# Patient Record
Sex: FEMALE | Race: OTHER | NOT HISPANIC OR LATINO
[De-identification: names, ages, dates, MRNs, and addresses within clinical notes are randomized per-mention and may not be internally consistent; named-entity substitution may affect disease eponyms.]

---

## 2018-07-24 PROBLEM — Z00.00 ENCOUNTER FOR PREVENTIVE HEALTH EXAMINATION: Status: ACTIVE | Noted: 2018-07-24

## 2018-09-10 ENCOUNTER — APPOINTMENT (OUTPATIENT)
Dept: OBGYN | Facility: CLINIC | Age: 23
End: 2018-09-10
Payer: COMMERCIAL

## 2018-09-10 VITALS
WEIGHT: 154 LBS | DIASTOLIC BLOOD PRESSURE: 62 MMHG | BODY MASS INDEX: 23.34 KG/M2 | SYSTOLIC BLOOD PRESSURE: 106 MMHG | HEIGHT: 68 IN

## 2018-09-10 DIAGNOSIS — Z01.411 ENCOUNTER FOR GYNECOLOGICAL EXAMINATION (GENERAL) (ROUTINE) WITH ABNORMAL FINDINGS: ICD-10-CM

## 2018-09-10 DIAGNOSIS — Z78.9 OTHER SPECIFIED HEALTH STATUS: ICD-10-CM

## 2018-09-10 DIAGNOSIS — N94.6 DYSMENORRHEA, UNSPECIFIED: ICD-10-CM

## 2018-09-10 DIAGNOSIS — Z11.3 ENCOUNTER FOR SCREENING FOR INFECTIONS WITH A PREDOMINANTLY SEXUAL MODE OF TRANSMISSION: ICD-10-CM

## 2018-09-10 DIAGNOSIS — Z82.49 FAMILY HISTORY OF ISCHEMIC HEART DISEASE AND OTHER DISEASES OF THE CIRCULATORY SYSTEM: ICD-10-CM

## 2018-09-10 DIAGNOSIS — Z80.3 FAMILY HISTORY OF MALIGNANT NEOPLASM OF BREAST: ICD-10-CM

## 2018-09-10 PROCEDURE — 36415 COLL VENOUS BLD VENIPUNCTURE: CPT

## 2018-09-10 PROCEDURE — 99385 PREV VISIT NEW AGE 18-39: CPT

## 2018-09-11 LAB
HBV SURFACE AG SER QL: NONREACTIVE
HCV AB SER QL: NONREACTIVE
HCV S/CO RATIO: 0.21 S/CO
HIV1+2 AB SPEC QL IA.RAPID: NONREACTIVE
T PALLIDUM AB SER QL IA: NEGATIVE

## 2018-09-13 LAB
C TRACH RRNA SPEC QL NAA+PROBE: NOT DETECTED
N GONORRHOEA RRNA SPEC QL NAA+PROBE: NOT DETECTED
SOURCE TP AMPLIFICATION: NORMAL

## 2018-09-14 LAB — CYTOLOGY CVX/VAG DOC THIN PREP: NORMAL

## 2018-11-13 ENCOUNTER — APPOINTMENT (OUTPATIENT)
Dept: OBGYN | Facility: CLINIC | Age: 23
End: 2018-11-13
Payer: COMMERCIAL

## 2018-11-13 ENCOUNTER — LABORATORY RESULT (OUTPATIENT)
Age: 23
End: 2018-11-13

## 2018-11-13 PROCEDURE — 57454 BX/CURETT OF CERVIX W/SCOPE: CPT

## 2018-12-08 ENCOUNTER — EMERGENCY (EMERGENCY)
Facility: HOSPITAL | Age: 23
LOS: 0 days | Discharge: HOME | End: 2018-12-08
Attending: EMERGENCY MEDICINE
Payer: SELF-PAY

## 2018-12-08 VITALS
SYSTOLIC BLOOD PRESSURE: 104 MMHG | DIASTOLIC BLOOD PRESSURE: 61 MMHG | OXYGEN SATURATION: 100 % | RESPIRATION RATE: 17 BRPM | WEIGHT: 119.93 LBS | HEART RATE: 100 BPM | HEIGHT: 66 IN | TEMPERATURE: 97 F

## 2018-12-08 VITALS
SYSTOLIC BLOOD PRESSURE: 116 MMHG | OXYGEN SATURATION: 98 % | HEART RATE: 104 BPM | RESPIRATION RATE: 16 BRPM | DIASTOLIC BLOOD PRESSURE: 64 MMHG | TEMPERATURE: 98 F

## 2018-12-08 DIAGNOSIS — F10.129 ALCOHOL ABUSE WITH INTOXICATION, UNSPECIFIED: ICD-10-CM

## 2018-12-08 LAB
ALBUMIN SERPL ELPH-MCNC: 3.7 G/DL — SIGNIFICANT CHANGE UP (ref 3.3–5)
ALP SERPL-CCNC: 54 U/L — SIGNIFICANT CHANGE UP (ref 40–120)
ALT FLD-CCNC: 17 U/L — SIGNIFICANT CHANGE UP (ref 12–78)
ANION GAP SERPL CALC-SCNC: 10 MMOL/L — SIGNIFICANT CHANGE UP (ref 5–17)
AST SERPL-CCNC: 15 U/L — SIGNIFICANT CHANGE UP (ref 15–37)
BASOPHILS # BLD AUTO: 0.06 K/UL — SIGNIFICANT CHANGE UP (ref 0–0.2)
BASOPHILS NFR BLD AUTO: 0.5 % — SIGNIFICANT CHANGE UP (ref 0–2)
BILIRUB SERPL-MCNC: 0.3 MG/DL — SIGNIFICANT CHANGE UP (ref 0.2–1.2)
BUN SERPL-MCNC: 12 MG/DL — SIGNIFICANT CHANGE UP (ref 7–23)
CALCIUM SERPL-MCNC: 8.6 MG/DL — SIGNIFICANT CHANGE UP (ref 8.5–10.1)
CHLORIDE SERPL-SCNC: 112 MMOL/L — HIGH (ref 96–108)
CO2 SERPL-SCNC: 23 MMOL/L — SIGNIFICANT CHANGE UP (ref 22–31)
CREAT SERPL-MCNC: 0.88 MG/DL — SIGNIFICANT CHANGE UP (ref 0.5–1.3)
EOSINOPHIL # BLD AUTO: 0.06 K/UL — SIGNIFICANT CHANGE UP (ref 0–0.5)
EOSINOPHIL NFR BLD AUTO: 0.5 % — SIGNIFICANT CHANGE UP (ref 0–6)
ETHANOL SERPL-MCNC: 284 MG/DL — HIGH (ref 0–10)
GLUCOSE SERPL-MCNC: 100 MG/DL — HIGH (ref 70–99)
HCG SERPL-ACNC: <1 MIU/ML — SIGNIFICANT CHANGE UP
HCT VFR BLD CALC: 40 % — SIGNIFICANT CHANGE UP (ref 34.5–45)
HGB BLD-MCNC: 13.6 G/DL — SIGNIFICANT CHANGE UP (ref 11.5–15.5)
IMM GRANULOCYTES NFR BLD AUTO: 0.3 % — SIGNIFICANT CHANGE UP (ref 0–1.5)
LYMPHOCYTES # BLD AUTO: 39.7 % — SIGNIFICANT CHANGE UP (ref 13–44)
LYMPHOCYTES # BLD AUTO: 4.44 K/UL — HIGH (ref 1–3.3)
MCHC RBC-ENTMCNC: 32 PG — SIGNIFICANT CHANGE UP (ref 27–34)
MCHC RBC-ENTMCNC: 34 GM/DL — SIGNIFICANT CHANGE UP (ref 32–36)
MCV RBC AUTO: 94.1 FL — SIGNIFICANT CHANGE UP (ref 80–100)
MONOCYTES # BLD AUTO: 0.69 K/UL — SIGNIFICANT CHANGE UP (ref 0–0.9)
MONOCYTES NFR BLD AUTO: 6.2 % — SIGNIFICANT CHANGE UP (ref 2–14)
NEUTROPHILS # BLD AUTO: 5.9 K/UL — SIGNIFICANT CHANGE UP (ref 1.8–7.4)
NEUTROPHILS NFR BLD AUTO: 52.8 % — SIGNIFICANT CHANGE UP (ref 43–77)
NRBC # BLD: 0 /100 WBCS — SIGNIFICANT CHANGE UP (ref 0–0)
PLATELET # BLD AUTO: 314 K/UL — SIGNIFICANT CHANGE UP (ref 150–400)
POTASSIUM SERPL-MCNC: 3.4 MMOL/L — LOW (ref 3.5–5.3)
POTASSIUM SERPL-SCNC: 3.4 MMOL/L — LOW (ref 3.5–5.3)
PROT SERPL-MCNC: 8.1 GM/DL — SIGNIFICANT CHANGE UP (ref 6–8.3)
RBC # BLD: 4.25 M/UL — SIGNIFICANT CHANGE UP (ref 3.8–5.2)
RBC # FLD: 11.5 % — SIGNIFICANT CHANGE UP (ref 10.3–14.5)
SODIUM SERPL-SCNC: 145 MMOL/L — SIGNIFICANT CHANGE UP (ref 135–145)
WBC # BLD: 11.18 K/UL — HIGH (ref 3.8–10.5)
WBC # FLD AUTO: 11.18 K/UL — HIGH (ref 3.8–10.5)

## 2018-12-08 PROCEDURE — 72125 CT NECK SPINE W/O DYE: CPT | Mod: 26

## 2018-12-08 PROCEDURE — 99284 EMERGENCY DEPT VISIT MOD MDM: CPT | Mod: 25

## 2018-12-08 PROCEDURE — 70450 CT HEAD/BRAIN W/O DYE: CPT | Mod: 26

## 2018-12-08 PROCEDURE — 99053 MED SERV 10PM-8AM 24 HR FAC: CPT

## 2018-12-08 RX ORDER — METOCLOPRAMIDE HCL 10 MG
10 TABLET ORAL ONCE
Qty: 0 | Refills: 0 | Status: COMPLETED | OUTPATIENT
Start: 2018-12-08 | End: 2018-12-08

## 2018-12-08 RX ORDER — SODIUM CHLORIDE 9 MG/ML
3 INJECTION INTRAMUSCULAR; INTRAVENOUS; SUBCUTANEOUS ONCE
Qty: 0 | Refills: 0 | Status: COMPLETED | OUTPATIENT
Start: 2018-12-08 | End: 2018-12-08

## 2018-12-08 RX ORDER — SODIUM CHLORIDE 9 MG/ML
1000 INJECTION, SOLUTION INTRAVENOUS
Qty: 0 | Refills: 0 | Status: COMPLETED | OUTPATIENT
Start: 2018-12-08 | End: 2018-12-08

## 2018-12-08 RX ADMIN — Medication 10 MILLIGRAM(S): at 02:32

## 2018-12-08 RX ADMIN — SODIUM CHLORIDE 3 MILLILITER(S): 9 INJECTION INTRAMUSCULAR; INTRAVENOUS; SUBCUTANEOUS at 02:31

## 2018-12-08 RX ADMIN — SODIUM CHLORIDE 125 MILLILITER(S): 9 INJECTION, SOLUTION INTRAVENOUS at 03:07

## 2018-12-08 NOTE — ED PROVIDER NOTE - OBJECTIVE STATEMENT
Pertinent PMH/PSH/FHx/SHx and Review of Systems contained within:  24 yo m with no pmh bib coworkers for nbnb vomitus and multiple falls tonight s/p drinking a lot of shots at holiday party tonight. No aggravating or relieving factors, No fever/chills, No photophobia/eye pain/changes in vision, No ear pain/sore throat/dysphagia, No chest pain/palpitations, no SOB/cough/wheeze/stridor, No abdominal pain, No D, no dysuria/frequency/discharge, No neck/back pain, no rash, no changes in neurological status/function.

## 2018-12-08 NOTE — ED PROVIDER NOTE - CONSTITUTIONAL, MLM
normal... intoxicated, awake, alert, oriented to person, place, time/situation and in no apparent distress.

## 2018-12-08 NOTE — ED ADULT NURSE NOTE - NSIMPLEMENTINTERV_GEN_ALL_ED
Implemented All Fall Risk Interventions:  [place] to call system. Call bell, personal items and telephone within reach. Instruct patient to call for assistance. Room bathroom lighting operational. Non-slip footwear when patient is off stretcher. Physically safe environment: no spills, clutter or unnecessary equipment. Stretcher in lowest position, wheels locked, appropriate side rails in place. Provide visual cue, wrist band, yellow gown, etc. Monitor gait and stability. Monitor for mental status changes and reorient to person, place, and time. Review medications for side effects contributing to fall risk. Reinforce activity limits and safety measures with patient and family.

## 2018-12-08 NOTE — ED PROVIDER NOTE - MEDICAL DECISION MAKING DETAILS
labs and imaging reviewed. patient received ivfs and zofran. patient currently feels well and clinically sober. brother at bedside ready to take her home. Discussed results and outcome of testing with the patient.  Patient advised to please follow up with their primary care doctor within the next 24 hours and return to the Emergency Department for worsening symptoms or any other concerns.  Patient advised that their doctor may call  to follow up on the specific results of the tests performed today in the emergency department.

## 2018-12-08 NOTE — ED ADULT NURSE NOTE - OBJECTIVE STATEMENT
As per pt friends she had to much to drink at her [employer] party. she was vomiting As per pt friends she had to much to drink at her [employer] party. she was vomiting and hit her head, pt denies any pain, no visible injury noted

## 2019-01-22 ENCOUNTER — APPOINTMENT (OUTPATIENT)
Dept: OBGYN | Facility: CLINIC | Age: 24
End: 2019-01-22

## 2019-02-25 ENCOUNTER — APPOINTMENT (OUTPATIENT)
Dept: OBGYN | Facility: CLINIC | Age: 24
End: 2019-02-25
Payer: COMMERCIAL

## 2019-02-25 VITALS
SYSTOLIC BLOOD PRESSURE: 110 MMHG | HEIGHT: 68 IN | DIASTOLIC BLOOD PRESSURE: 66 MMHG | WEIGHT: 157 LBS | BODY MASS INDEX: 23.79 KG/M2

## 2019-02-25 PROCEDURE — 57522 CONIZATION OF CERVIX: CPT

## 2019-02-25 NOTE — PROCEDURE
[Colposcopy] : colposcopy [LGSIL] : low grade squamous intraepithelial lesion [Consent Obtained] : written consent was obtained prior to the procedure [No Abnormalities] : no abnormalities [Punctation ___ o'clock] : no punctation [Acetowhite ___ o'clock] : ascetowhite changes at [unfilled] ~Uo'clock [Mosaicism ___ o'clock] : no mosaicism [Atypical Vessels ___ o'clock] : no atypical vessels [Biopsies Taken: # ___] : [unfilled] biopsies taken of the cervix [Biopsy Locations ___ o'clock] : the biopsies were taken at [unfilled] o'clock [ECC Done] : Endocervical curettage was not performed. [Neena's] : Neena's solution [Tolerated Well] : the patient tolerated the procedure well [No Complications] : there were no complications

## 2019-02-25 NOTE — PROCEDURE
[Risks] : risks [Benefits] : benefits [Infection] : infection [Bleeding] : bleeding [Cervical Incompetence] : cervical incompetence [CONSENT OBTAINED] : written consent was obtained prior to the procedure. [SCJ Fully Visulized] : the squamocolumnar junction was fully visualized [No Abnormalities] : no abnormalities [Acetowhite ___ o'clock] : ascetowhite changes at [unfilled] ~Uo'clock [Mosaicism ___ o'clock] : no mosaicism [Atypical Vessels ___ o'clock] : no atypical vessels [Cone Biopsy] : A cone biopsy was performed using a ~M loop electrode.  The endocervix was excised to a 1 cm depth. [ECC Done] : endocervical curettage was not performed. [Coagulation] : ball electrode using coagulation [Sent to Path] : the excised lesion was place in buffered formalin and sent for pathlogy [Tolerated Well] : the patient tolerated the procedure well [de-identified] : KELLY II [de-identified] : lidocaine injected at 3 and 9

## 2019-03-06 ENCOUNTER — OTHER (OUTPATIENT)
Age: 24
End: 2019-03-06

## 2019-03-11 LAB — CORE LAB BIOPSY: NORMAL

## 2019-09-09 ENCOUNTER — LABORATORY RESULT (OUTPATIENT)
Age: 24
End: 2019-09-09

## 2019-09-09 ENCOUNTER — APPOINTMENT (OUTPATIENT)
Dept: OBGYN | Facility: CLINIC | Age: 24
End: 2019-09-09
Payer: COMMERCIAL

## 2019-09-09 VITALS — DIASTOLIC BLOOD PRESSURE: 80 MMHG | SYSTOLIC BLOOD PRESSURE: 120 MMHG

## 2019-09-09 DIAGNOSIS — R87.615 UNSATISFACTORY CYTOLOGIC SMEAR OF CERVIX: ICD-10-CM

## 2019-09-09 DIAGNOSIS — N87.1 MODERATE CERVICAL DYSPLASIA: ICD-10-CM

## 2019-09-09 PROCEDURE — 99213 OFFICE O/P EST LOW 20 MIN: CPT

## 2019-09-09 RX ORDER — NORETHINDRONE ACETATE AND ETHINYL ESTRADIOL AND FERROUS FUMARATE 1MG-20(21)
1-20 KIT ORAL
Qty: 1 | Refills: 11 | Status: DISCONTINUED | COMMUNITY
Start: 2018-09-10 | End: 2019-09-09

## 2019-09-18 ENCOUNTER — OTHER (OUTPATIENT)
Age: 24
End: 2019-09-18

## 2019-10-17 ENCOUNTER — APPOINTMENT (OUTPATIENT)
Dept: OBGYN | Facility: CLINIC | Age: 24
End: 2019-10-17
Payer: COMMERCIAL

## 2019-10-17 VITALS — DIASTOLIC BLOOD PRESSURE: 70 MMHG | SYSTOLIC BLOOD PRESSURE: 110 MMHG

## 2019-10-17 DIAGNOSIS — R87.610 ATYPICAL SQUAMOUS CELLS OF UNDETERMINED SIGNIFICANCE ON CYTOLOGIC SMEAR OF CERVIX (ASC-US): ICD-10-CM

## 2019-10-17 DIAGNOSIS — R87.810 ATYPICAL SQUAMOUS CELLS OF UNDETERMINED SIGNIFICANCE ON CYTOLOGIC SMEAR OF CERVIX (ASC-US): ICD-10-CM

## 2019-10-17 PROCEDURE — 57452 EXAM OF CERVIX W/SCOPE: CPT

## 2019-10-17 NOTE — PROCEDURE
[ASCUS] : atypical squamous cells of undetermined significance [Colposcopy] : colposcopy [HPV high risk] : PCR positive for high risk HPV [Patient] : patient [Risks] : risks [Benefits] : benefits [Alternatives] : alternatives [Infection] : infection [Bleeding] : bleeding [Allergic Reaction] : allergic reaction [Consent Obtained] : written consent was obtained prior to the procedure [Pap Performed] : a cervical Pap smear was not performed [SCJ Fully Visulized] : the squamocolumnar junction was fully visualized [No Abnormalities] : no abnormalities [Biopsies Taken: # ___] : no biopsies were taken of the cervix [Tolerated Well] : the patient tolerated the procedure well [No Complications] : there were no complications

## 2019-11-01 LAB — CYTOLOGY CVX/VAG DOC THIN PREP: ABNORMAL

## 2020-10-01 ENCOUNTER — APPOINTMENT (OUTPATIENT)
Dept: OBGYN | Facility: CLINIC | Age: 25
End: 2020-10-01
Payer: COMMERCIAL

## 2020-10-01 VITALS
TEMPERATURE: 98.4 F | WEIGHT: 153 LBS | DIASTOLIC BLOOD PRESSURE: 72 MMHG | BODY MASS INDEX: 23.19 KG/M2 | HEIGHT: 68 IN | SYSTOLIC BLOOD PRESSURE: 124 MMHG

## 2020-10-01 DIAGNOSIS — Z01.419 ENCOUNTER FOR GYNECOLOGICAL EXAMINATION (GENERAL) (ROUTINE) W/OUT ABNORMAL FINDINGS: ICD-10-CM

## 2020-10-01 PROCEDURE — 99395 PREV VISIT EST AGE 18-39: CPT

## 2020-10-01 NOTE — HISTORY OF PRESENT ILLNESS
[FreeTextEntry1] : patient presents today for routine annual exam.\par  [TextBox_4] : no complaints  [PapSmeardate] : 9/2019 [LMPDate] : 9/23/2020

## 2020-10-04 ENCOUNTER — NON-APPOINTMENT (OUTPATIENT)
Age: 25
End: 2020-10-04

## 2020-10-05 LAB — HPV HIGH+LOW RISK DNA PNL CVX: DETECTED

## 2020-10-07 LAB — CYTOLOGY CVX/VAG DOC THIN PREP: ABNORMAL

## 2020-10-26 ENCOUNTER — APPOINTMENT (OUTPATIENT)
Dept: OBGYN | Facility: CLINIC | Age: 25
End: 2020-10-26
Payer: COMMERCIAL

## 2020-10-26 VITALS — DIASTOLIC BLOOD PRESSURE: 60 MMHG | SYSTOLIC BLOOD PRESSURE: 100 MMHG | TEMPERATURE: 97.5 F

## 2020-10-26 DIAGNOSIS — R87.612 LOW GRADE SQUAMOUS INTRAEPITHELIAL LESION ON CYTOLOGIC SMEAR OF CERVIX (LGSIL): ICD-10-CM

## 2020-10-26 PROCEDURE — 57454 BX/CURETT OF CERVIX W/SCOPE: CPT

## 2020-10-26 PROCEDURE — 99072 ADDL SUPL MATRL&STAF TM PHE: CPT

## 2020-10-29 LAB — CORE LAB BIOPSY: NORMAL

## 2020-12-23 PROBLEM — Z01.419 ENCOUNTER FOR ROUTINE GYNECOLOGICAL EXAMINATION: Status: RESOLVED | Noted: 2018-09-10 | Resolved: 2020-12-23

## 2021-08-21 ENCOUNTER — EMERGENCY (EMERGENCY)
Facility: HOSPITAL | Age: 26
LOS: 1 days | Discharge: LEFT BEFORE DISCHARGE | End: 2021-08-21
Attending: EMERGENCY MEDICINE
Payer: COMMERCIAL

## 2021-08-21 VITALS
DIASTOLIC BLOOD PRESSURE: 65 MMHG | TEMPERATURE: 98 F | OXYGEN SATURATION: 100 % | SYSTOLIC BLOOD PRESSURE: 109 MMHG | WEIGHT: 158.73 LBS | RESPIRATION RATE: 18 BRPM | HEART RATE: 73 BPM | HEIGHT: 68.5 IN

## 2021-08-21 PROCEDURE — L9991: CPT

## 2021-08-21 NOTE — ED PROVIDER NOTE - NSCAREINITIATED _GEN_ER
Chemotherapy Nurses Note:       Rituxan Non-Oncology Reji Navarro(Attending) rituxin infusion  for encephalitis per Neurology